# Patient Record
Sex: FEMALE | HISPANIC OR LATINO | Employment: UNEMPLOYED | URBAN - METROPOLITAN AREA
[De-identification: names, ages, dates, MRNs, and addresses within clinical notes are randomized per-mention and may not be internally consistent; named-entity substitution may affect disease eponyms.]

---

## 2017-03-01 ENCOUNTER — OFFICE VISIT (OUTPATIENT)
Dept: FAMILY MEDICINE | Facility: CLINIC | Age: 42
End: 2017-03-01

## 2017-03-01 VITALS
HEIGHT: 62 IN | BODY MASS INDEX: 28.89 KG/M2 | DIASTOLIC BLOOD PRESSURE: 79 MMHG | TEMPERATURE: 98 F | SYSTOLIC BLOOD PRESSURE: 119 MMHG | HEART RATE: 66 BPM | OXYGEN SATURATION: 98 % | WEIGHT: 157 LBS

## 2017-03-01 DIAGNOSIS — H66.012 ACUTE SUPPURATIVE OTITIS MEDIA OF LEFT EAR WITH SPONTANEOUS RUPTURE OF TYMPANIC MEMBRANE, RECURRENCE NOT SPECIFIED: ICD-10-CM

## 2017-03-01 PROCEDURE — 99213 OFFICE O/P EST LOW 20 MIN: CPT | Performed by: FAMILY MEDICINE

## 2017-03-01 RX ORDER — OFLOXACIN 3 MG/ML
10 SOLUTION AURICULAR (OTIC) 2 TIMES DAILY
Qty: 7 ML | Refills: 0 | Status: SHIPPED | OUTPATIENT
Start: 2017-03-01 | End: 2017-03-08

## 2017-03-01 NOTE — MR AVS SNAPSHOT
"              After Visit Summary   3/1/2017    Blanche Fowler    MRN: [id number]           Patient Information     Date Of Birth          1975        Visit Information        Provider Department      3/1/2017 11:40 AM Savannah Steve MD [facility]        Today's Diagnoses     Acute suppurative otitis media of left ear with spontaneous rupture of tympanic membrane, recurrence not specified           Follow-ups after your visit        Who to contact     If you have questions or need follow up information about today's clinic visit or your schedule please contact [facility] directly at [phone].  Normal or non-critical lab and imaging results will be communicated to you by [facility], letter or phone within 4 business days after the clinic has received the results. If you do not hear from us within 7 days, please contact the clinic through [facility] or phone. If you have a critical or abnormal lab result, we will notify you by phone as soon as possible.  Submit refill requests through [employer] or call your pharmacy and they will forward the refill request to us. Please allow 3 business days for your refill to be completed.          Additional Information About Your Visit        MyChart Information     [employer] lets you send messages to your doctor, view your test results, renew your prescriptions, schedule appointments and more. To sign up, go to [email or web address] . Click on \"Log in\" on the left side of the screen, which will take you to the Welcome page. Then click on \"Sign up Now\" on the right side of the page.     You will be asked to enter the access code listed below, as well as some personal information. Please follow the directions to create your username and password.     Your access code is: [email or web address]  Expires: 2017 12:10 PM     Your access code will  in 90 days. If you need help or a new code, please call your [facility] or " "[phone].        Care EveryWhere ID     This is your Care EveryWhere ID. This could be used by other organizations to access your [facility] medical records  [id number]        Your Vitals Were     Pulse Temperature Height Pulse Oximetry BMI (Body Mass Index)       66 98  F (36.7  C) (Oral) 5' 2.25\" (1.581 m) 98% 28.49 kg/m2        Blood Pressure from Last 3 Encounters:   03/01/17 119/79   06/16/16 110/72   02/27/16 130/80    Weight from Last 3 Encounters:   03/01/17 157 lb (71.2 kg)   06/16/16 158 lb 12.8 oz (72 kg)   02/27/16 157 lb 3.2 oz (71.3 kg)              Today, you had the following     No orders found for display         Today's Medication Changes          These changes are accurate as of: 3/1/17 12:10 PM.  If you have any questions, ask your nurse or doctor.               Start taking these medicines.        Dose/Directions    ofloxacin 0.3 % otic solution   Commonly known as:  FLOXIN   Used for:  Acute suppurative otitis media of left ear with spontaneous rupture of tympanic membrane, recurrence not specified   Started by:  Savannah Steve MD        Dose:  10 drop   Place 10 drops Into the left ear 2 times daily for 7 days   Quantity:  7 mL   Refills:  0         Stop taking these medicines if you haven't already. Please contact your care team if you have questions.     SUDAFED PO   Stopped by:  Savannah Steve MD                Where to get your medicines      These medications were sent to [place] [address] [facility] [place] - [address]  [address] [place] [address]     Phone:  [phone]     ofloxacin 0.3 % otic solution                Primary Care Provider Office Phone # Fax #    Bibi Talbot PA-C [phone]       [facility] [address]  [facility] [address]        Thank you!     Thank you for choosing [facility]  for your care. Our goal is always to provide you with excellent care. Hearing back from our " patients is one way we can continue to improve our services. Please take a few minutes to complete the written survey that you may receive in the mail after your visit with us. Thank you!             Your Updated Medication List - Protect others around you: Learn how to safely use, store and throw away your medicines at www.disposemymeds.org.          This list is accurate as of: 3/1/17 12:10 PM.  Always use your most recent med list.                   Brand Name Dispense Instructions for use    ofloxacin 0.3 % otic solution    FLOXIN    7 mL    Place 10 drops Into the left ear 2 times daily for 7 days

## 2017-03-01 NOTE — NURSING NOTE
"Chief Complaint   Patient presents with     Ear Problem     L ear pain x 3 days        Initial /79 (BP Location: Left arm, Patient Position: Chair, Cuff Size: Adult Regular)  Pulse 66  Temp 98  F (36.7  C) (Oral)  Ht 5' 2.25\" (1.581 m)  Wt 157 lb (71.2 kg)  SpO2 98%  BMI 28.49 kg/m2 Estimated body mass index is 28.49 kg/(m^2) as calculated from the following:    Height as of this encounter: 5' 2.25\" (1.581 m).    Weight as of this encounter: 157 lb (71.2 kg).  Medication Reconciliation: complete  Lyle Castro MA    "

## 2017-03-01 NOTE — PROGRESS NOTES
"  SUBJECTIVE:                                                    Blanche Fowler is a 41 year old female who presents to clinic today for the following health issues:    ENT Symptoms             Symptoms: cc Present Absent Comment   Fever/Chills  x  Maybe  this morning    Fatigue  x       Muscle Aches   x    Eye Irritation  x     Sneezing   x    Nasal Bacilio/Drg   x    Sinus Pressure/Pain   x    Loss of smell   x    Dental pain   x    Sore Throat   x    Swollen Glands   x    Ear Pain/Fullness  x  L ear pain x   Cough   x    Wheeze   x    Chest Pain   x    Shortness of breath   x    Rash   x    Other         Symptom duration:  3 days    Symptom severity:  moderate   Treatments tried:  ibuprofen    Contacts:  none       She had Yellowish colored drainage from her left ear this morning.   She has had similar symptoms last year and was treated with eardrops.     Problem list and histories reviewed & adjusted, as indicated.  Additional history: as documented    BP Readings from Last 3 Encounters:   03/01/17 119/79   06/16/16 110/72   02/27/16 130/80    Wt Readings from Last 3 Encounters:   03/01/17 157 lb (71.2 kg)   06/16/16 158 lb 12.8 oz (72 kg)   02/27/16 157 lb 3.2 oz (71.3 kg)            Reviewed and updated as needed this visit by clinical staff       Reviewed and updated as needed this visit by Provider         ROS:  Constitutional, HEENT, cardiovascular, pulmonary, gi and gu systems are negative, except as otherwise noted.    OBJECTIVE:                                                    /79 (BP Location: Left arm, Patient Position: Chair, Cuff Size: Adult Regular)  Pulse 66  Temp 98  F (36.7  C) (Oral)  Ht 5' 2.25\" (1.581 m)  Wt 157 lb (71.2 kg)  SpO2 98%  BMI 28.49 kg/m2  Body mass index is 28.49 kg/(m^2).  GENERAL: healthy, alert and no distress  ENT:   Ears:   Right ear: normal EAC and TM.   Left ear: normal EAC, no fluid. TM: mild erythema, yellowish colored fluid behind TM, dull light reflex.   Nose and " throat exam: normal.   NECK: no adenopathy  RESP: lungs clear to auscultation - no rales, rhonchi or wheezes  CV: regular rate and rhythm       ASSESSMENT/PLAN:                                                        ICD-10-CM    1. Acute suppurative otitis media of left ear with spontaneous rupture of tympanic membrane, recurrence not specified H66.012 ofloxacin (FLOXIN) 0.3 % otic solution     Patient has yellowish colored fluid behind the eardrum,TM was intact on exam.   Considering history of fluid drainage from the ear canal this morning decided to treat with antibiotic drops.   Patient to take OTC pain medications for pain control as needed.   Follow-up if not better.       Savannah Steve MD  [facility]

## 2018-07-31 ENCOUNTER — HEALTH MAINTENANCE LETTER (OUTPATIENT)
Age: 43
End: 2018-07-31

## 2019-08-12 ENCOUNTER — ANCILLARY PROCEDURE (OUTPATIENT)
Dept: MAMMOGRAPHY | Facility: CLINIC | Age: 44
End: 2019-08-12
Attending: FAMILY MEDICINE

## 2019-08-12 ENCOUNTER — OFFICE VISIT (OUTPATIENT)
Dept: FAMILY MEDICINE | Facility: CLINIC | Age: 44
End: 2019-08-12

## 2019-08-12 ENCOUNTER — ANCILLARY PROCEDURE (OUTPATIENT)
Dept: ULTRASOUND IMAGING | Facility: CLINIC | Age: 44
End: 2019-08-12
Attending: FAMILY MEDICINE

## 2019-08-12 VITALS
WEIGHT: 152.6 LBS | TEMPERATURE: 98.9 F | DIASTOLIC BLOOD PRESSURE: 68 MMHG | OXYGEN SATURATION: 99 % | HEIGHT: 62 IN | HEART RATE: 77 BPM | RESPIRATION RATE: 16 BRPM | SYSTOLIC BLOOD PRESSURE: 122 MMHG | BODY MASS INDEX: 28.08 KG/M2

## 2019-08-12 DIAGNOSIS — N64.4 BREAST PAIN IN FEMALE: ICD-10-CM

## 2019-08-12 DIAGNOSIS — N64.4 BREAST PAIN IN FEMALE: Primary | ICD-10-CM

## 2019-08-12 DIAGNOSIS — N63.0 BREAST LUMP IN LOWER OUTER QUADRANT: ICD-10-CM

## 2019-08-12 PROCEDURE — 99213 OFFICE O/P EST LOW 20 MIN: CPT | Performed by: FAMILY MEDICINE

## 2019-08-12 PROCEDURE — 77066 DX MAMMO INCL CAD BI: CPT | Performed by: RADIOLOGY

## 2019-08-12 PROCEDURE — 76642 ULTRASOUND BREAST LIMITED: CPT | Mod: LT | Performed by: RADIOLOGY

## 2019-08-12 PROCEDURE — G0279 TOMOSYNTHESIS, MAMMO: HCPCS | Performed by: RADIOLOGY

## 2019-08-12 ASSESSMENT — MIFFLIN-ST. JEOR: SCORE: 1295.44

## 2019-08-12 NOTE — PROGRESS NOTES
"Subjective     Blanche Fowler is a 44 year old female who presents to clinic today for the following health issues:    HPI     Concern - Left breast lump painful x 1 day. Pain started after a close hug  Onset: noticed it on Sat    Description:   Left breast lump painful    Intensity: severe    Progression of Symptoms:  worsening    Accompanying Signs & Symptoms:  none    Previous history of similar problem:   none    Precipitating factors:   Worsened by: when people try to hug her    Alleviating factors:  Improved by: ibuprofen  LMP: End of July. Not pregnant,    Therapies Tried and outcome: ibuprofen helped     PROBLEMS TO ADD ON...  Reviewed and updated as needed this visit by Provider    Review of Systems   HEENT, cardiovascular, pulmonary, gi and gu systems are negative, except as otherwise noted.      Objective    /68   Pulse 77   Temp 98.9  F (37.2  C) (Oral)   Resp 16   Ht 1.575 m (5' 2\")   Wt 69.2 kg (152 lb 9.6 oz)   LMP 07/30/2019 (Approximate)   SpO2 99%   BMI 27.91 kg/m    Body mass index is 27.91 kg/m .  Physical Exam   GENERAL: healthy, alert and no distress  NECK: no adenopathy,  and thyroid normal to palpation  BREAST: Vague tenderness Lt breast upper outer quadrant. No lumps or axillary adenopathy  RESP: lungs clear to auscultation - no rales, rhonchi or wheezes  CV: regular rate and rhythm, normal S1 S2, no S3 or S4, no murmur, click or rub, no peripheral edema   MS: no gross musculoskeletal defects noted, no edema    Diagnostic Test Results:  Labs reviewed in Epic        Assessment & Plan     Blanche was seen today for breast problem.    Diagnoses and all orders for this visit:    Breast pain in female      Hormonal related, work up  -     MA Screen Left w/Nimesh; Future     ? Breast lump in lower outer quadrant: subjective     No lumps clinically  -     MA Screen Left w/Nimesh; Future    Return in about 1 week (around 8/19/2019) for Follow up for symptoms recheck.    Dirk Lockhart " MD Nichelle  [facility]

## 2020-01-14 ENCOUNTER — TELEPHONE (OUTPATIENT)
Dept: FAMILY MEDICINE | Facility: CLINIC | Age: 45
End: 2020-01-14

## 2020-01-14 NOTE — TELEPHONE ENCOUNTER
Panel Management Review      Patient has the following on her problem list: None      Composite cancer screening  Chart review shows that this patient is due/due soon for the following Pap Smear  Summary:    Patient is due/failing the following:   PAP and PHYSICAL    Action needed:   Patient needs office visit for CPE and PAP.    Type of outreach:    Sent letter.    Questions for provider review:    None                                                                                                                                    Kimberly Jauregui [facility]         Chart routed to  .

## 2020-01-14 NOTE — LETTER
January 14, 2020          Blanche Fowler,  [address]  [facility] [address]        Dear Blanche Fowler      Monitoring and managing your preventative and chronic health conditions are very important to us. Our records indicate that you have not scheduled for Pap Smear and Annual physical exam  which was recommended by Dr. Steward      If you have received your health care elsewhere, please call the clinic so the information can be documented in your chart.    Please call [phone] or message us through your [employer] account to schedule an appointment or provide information for your chart.     Feel free to contact us if you have any questions or concerns!    I look forward to seeing you and working with you on your health care needs.     Sincerely,       Your [facility] Team

## 2021-10-28 ENCOUNTER — NURSE TRIAGE (OUTPATIENT)
Dept: NURSING | Facility: CLINIC | Age: 46
End: 2021-10-28

## 2021-10-28 NOTE — TELEPHONE ENCOUNTER
Shortness of breath at rest, chest pain and pressure started last night. She has someone who can help her get to the ER for evaluation now.  Darcy Warren RN  [facility]      Reason for Disposition    MODERATE difficulty breathing (e.g., speaks in phrases, SOB even at rest, pulse 100-120)    Additional Information    Negative: SEVERE difficulty breathing (e.g., struggling for each breath, speaks in single words)    Negative: Difficult to awaken or acting confused (e.g., disoriented, slurred speech)    Negative: Bluish (or gray) lips or face now    Negative: Shock suspected (e.g., cold/pale/clammy skin, too weak to stand, low BP, rapid pulse)    Negative: Sounds like a life-threatening emergency to the triager    Negative: [1] COVID-19 exposure AND [2] no symptoms    Negative: COVID-19 vaccine reaction suspected (e.g., fever, headache, muscle aches) occurring 1 to 3 days after getting vaccine    Negative: COVID-19 vaccine, questions about    Negative: [1] Lives with someone known to have influenza (flu test positive) AND [2] flu-like symptoms (e.g., cough, runny nose, sore throat, SOB; with or without fever)    Negative: [1] Adult with possible COVID-19 symptoms AND [2] triager concerned about severity of symptoms or other causes    Negative: COVID-19 and breastfeeding, questions about    Negative: SEVERE or constant chest pain or pressure (Exception: mild central chest pain, present only when coughing)    Protocols used: CORONAVIRUS (COVID-19) DIAGNOSED OR [id number] 8.25.2021

## 2022-04-28 ENCOUNTER — LAB (OUTPATIENT)
Dept: LAB | Facility: CLINIC | Age: 47
End: 2022-04-28

## 2022-04-28 DIAGNOSIS — Z13.0 SCREENING FOR IRON DEFICIENCY ANEMIA: Primary | ICD-10-CM

## 2022-04-28 LAB — HGB BLD-MCNC: 13.8 G/DL (ref 11.7–15.7)

## 2022-04-28 PROCEDURE — 36415 COLL VENOUS BLD VENIPUNCTURE: CPT

## 2022-04-28 PROCEDURE — 85018 HEMOGLOBIN: CPT

## 2022-08-25 ENCOUNTER — NURSE TRIAGE (OUTPATIENT)
Dept: FAMILY MEDICINE | Facility: CLINIC | Age: 47
End: 2022-08-25

## 2022-08-25 NOTE — TELEPHONE ENCOUNTER
"Bibi,     Patient called with concerns about vaginal itching and discomfort. Stated that she had intercourse with her boyfriend yesterday and noticed the pain and has gotten worse today. Per pt does not have pain with urinating but urine causes irritation to inside and outside of vagina. Per pt concerned she could have an STD from boyfriend. Per pt is currently in [place] which is roughly 3 hours away.     Per protocol to be seen in clinic. Patient asking if she could have tests or labs ordered. I stated that she might have to go to .     Please advise.     [phone], ok for detail vm.     Thanks,  Teressa RN  [facility]       Reason for Disposition    Genital area looks infected (e.g., draining sore, spreading redness)    Additional Information    Negative: Sounds like a life-threatening emergency to the triager    Negative: Patient sounds very sick or weak to the triager    Negative: SEVERE pain and not improved 2 hours after pain medicine    Negative: Genital area looks infected (e.g., draining sore, spreading redness) and fever    Negative: Something is hanging out of the vagina and can't easily be pushed back inside    Answer Assessment - Initial Assessment Questions  1. SYMPTOM: \"What's the main symptom you're concerned about?\" (e.g., pain, itching, dryness)      \"vaginal itching\", no pain with urinating but urine does irritate the site of itching and burning   2. LOCATION: \"Where is the itching and discomfort located?\" (e.g., inside/outside, left/right)      Inside and outside of vagina   3. ONSET: \"When did the  symptoms abive  start?\"      yesterday  4. PAIN: \"Is there any pain?\" If Yes, ask: \"How bad is it?\" (Scale: 1-10; mild, moderate, severe)      Moderate   5. ITCHING: \"Is there any itching?\" If Yes, ask: \"How bad is it?\" (Scale: 1-10; mild, moderate, severe)      Moderate   6. CAUSE: \"What do you think is causing the discharge?\" \"Have you had the same problem before? What happened " "then?\"      \"clear right now\"  7. OTHER SYMPTOMS: \"Do you have any other symptoms?\" (e.g., fever, itching, vaginal bleeding, pain with urination, injury to genital area, vaginal foreign body)      No fevers   8. PREGNANCY: \"Is there any chance you are pregnant?\" \"When was your last menstrual period?\"      No    Protocols used: VAGINAL SYMPTOMS-A-OH      "

## 2022-08-26 NOTE — TELEPHONE ENCOUNTER
Provider Response to 2nd Level Triage Request    I have reviewed the RN documentation. My recommendation is:  Refer to Urgent Care   Bibi Talbot PA-C

## 2022-08-26 NOTE — TELEPHONE ENCOUNTER
Called patient. Left detailed VM with message as written by provider. Requested call back if patient has any further questions.    Vivian Flanagan RN   [facility]